# Patient Record
Sex: MALE | Race: WHITE | NOT HISPANIC OR LATINO | Employment: UNEMPLOYED | URBAN - METROPOLITAN AREA
[De-identification: names, ages, dates, MRNs, and addresses within clinical notes are randomized per-mention and may not be internally consistent; named-entity substitution may affect disease eponyms.]

---

## 2021-01-04 ENCOUNTER — APPOINTMENT (OUTPATIENT)
Dept: LAB | Age: 46
End: 2021-01-04

## 2021-01-04 ENCOUNTER — TRANSCRIBE ORDERS (OUTPATIENT)
Dept: ADMINISTRATIVE | Age: 46
End: 2021-01-04

## 2021-01-04 DIAGNOSIS — Z11.59 ENCOUNTER FOR SCREENING FOR OTHER VIRAL DISEASES: Primary | ICD-10-CM

## 2021-01-04 DIAGNOSIS — Z11.3 SCREENING FOR STDS (SEXUALLY TRANSMITTED DISEASES): ICD-10-CM

## 2021-01-04 DIAGNOSIS — Z11.4 SCREENING FOR HUMAN IMMUNODEFICIENCY VIRUS: ICD-10-CM

## 2021-01-04 DIAGNOSIS — Z11.59 ENCOUNTER FOR SCREENING FOR OTHER VIRAL DISEASES: ICD-10-CM

## 2021-01-04 LAB
HBV SURFACE AG SER QL: NORMAL
HCV AB SER QL: NORMAL

## 2021-01-04 PROCEDURE — 87591 N.GONORRHOEAE DNA AMP PROB: CPT

## 2021-01-04 PROCEDURE — 87491 CHLMYD TRACH DNA AMP PROBE: CPT

## 2021-01-04 PROCEDURE — 86803 HEPATITIS C AB TEST: CPT

## 2021-01-04 PROCEDURE — 87389 HIV-1 AG W/HIV-1&-2 AB AG IA: CPT

## 2021-01-04 PROCEDURE — 86592 SYPHILIS TEST NON-TREP QUAL: CPT

## 2021-01-04 PROCEDURE — 36415 COLL VENOUS BLD VENIPUNCTURE: CPT

## 2021-01-04 PROCEDURE — 87340 HEPATITIS B SURFACE AG IA: CPT

## 2021-01-05 LAB
C TRACH DNA SPEC QL NAA+PROBE: NEGATIVE
HIV 1+2 AB+HIV1 P24 AG SERPL QL IA: NORMAL
N GONORRHOEA DNA SPEC QL NAA+PROBE: NEGATIVE
RPR SER QL: NORMAL

## 2021-06-11 ENCOUNTER — OFFICE VISIT (OUTPATIENT)
Dept: INTERNAL MEDICINE CLINIC | Facility: CLINIC | Age: 46
End: 2021-06-11
Payer: COMMERCIAL

## 2021-06-11 VITALS
OXYGEN SATURATION: 99 % | HEART RATE: 87 BPM | TEMPERATURE: 97.5 F | BODY MASS INDEX: 31.35 KG/M2 | SYSTOLIC BLOOD PRESSURE: 126 MMHG | DIASTOLIC BLOOD PRESSURE: 74 MMHG | WEIGHT: 219 LBS | HEIGHT: 70 IN

## 2021-06-11 DIAGNOSIS — E66.09 CLASS 1 OBESITY DUE TO EXCESS CALORIES WITHOUT SERIOUS COMORBIDITY WITH BODY MASS INDEX (BMI) OF 30.0 TO 30.9 IN ADULT: ICD-10-CM

## 2021-06-11 DIAGNOSIS — F11.11 NARCOTIC ABUSE IN REMISSION (HCC): ICD-10-CM

## 2021-06-11 DIAGNOSIS — Z00.00 HEALTHCARE MAINTENANCE: Primary | ICD-10-CM

## 2021-06-11 PROBLEM — E66.811 CLASS 1 OBESITY DUE TO EXCESS CALORIES WITHOUT SERIOUS COMORBIDITY IN ADULT: Status: ACTIVE | Noted: 2021-06-11

## 2021-06-11 PROCEDURE — 99204 OFFICE O/P NEW MOD 45 MIN: CPT | Performed by: INTERNAL MEDICINE

## 2021-06-11 RX ORDER — DIPHENOXYLATE HYDROCHLORIDE AND ATROPINE SULFATE 2.5; .025 MG/1; MG/1
1 TABLET ORAL DAILY
COMMUNITY

## 2021-06-11 RX ORDER — BUPRENORPHINE HYDROCHLORIDE AND NALOXONE HYDROCHLORIDE DIHYDRATE 8; 2 MG/1; MG/1
1 TABLET SUBLINGUAL DAILY
COMMUNITY

## 2021-06-11 NOTE — PATIENT INSTRUCTIONS
Heart Healthy Diet   AMBULATORY CARE:   A heart healthy diet  is an eating plan low in unhealthy fats and sodium (salt)  The plan is high in healthy fats and fiber  A heart healthy diet helps improve your cholesterol levels and lowers your risk for heart disease and stroke  A dietitian will teach you how to read and understand food labels  Heart healthy diet guidelines to follow:   · Choose foods that contain healthy fats  ? Unsaturated fats  include monounsaturated and polyunsaturated fats  Unsaturated fat is found in foods such as soybean, canola, olive, corn, and safflower oils  It is also found in soft tub margarine that is made with liquid vegetable oil  ? Omega-3 fat  is found in certain fish, such as salmon, tuna, and trout, and in walnuts and flaxseed  Eat fish high in omega-3 fats at least 2 times a week  · Get 20 to 30 grams of fiber each day  Fruits, vegetables, whole-grain foods, and legumes (cooked beans) are good sources of fiber  · Limit or do not have unhealthy fats  ? Cholesterol  is found in animal foods, such as eggs and lobster, and in dairy products made from whole milk  Limit cholesterol to less than 200 mg each day  ? Saturated fat  is found in meats, such as sanders and hamburger  It is also found in chicken or turkey skin, whole milk, and butter  Limit saturated fat to less than 7% of your total daily calories  ? Trans fat  is found in packaged foods, such as potato chips and cookies  It is also in hard margarine, some fried foods, and shortening  Do not eat foods that contain trans fats  · Limit sodium as directed  You may be told to limit sodium to 2,000 to 2,300 mg each day  Choose low-sodium or no-salt-added foods  Add little or no salt to food you prepare  Use herbs and spices in place of salt         Include the following in your heart healthy plan:  Ask your dietitian or healthcare provider how many servings to have from each of the following food groups:  · Grains:      ? Whole-wheat breads, cereals, and pastas, and brown rice    ? Low-fat, low-sodium crackers and chips    · Vegetables:      ? Broccoli, green beans, green peas, and spinach    ? Collards, kale, and lima beans    ? Carrots, sweet potatoes, tomatoes, and peppers    ? Canned vegetables with no salt added    · Fruits:      ? Bananas, peaches, pears, and pineapple    ? Grapes, raisins, and dates    ? Oranges, tangerines, grapefruit, orange juice, and grapefruit juice    ? Apricots, mangoes, melons, and papaya    ? Raspberries and strawberries    ? Canned fruit with no added sugar    · Low-fat dairy:      ? Nonfat (skim) milk, 1% milk, and low-fat almond, cashew, or soy milks fortified with calcium    ? Low-fat cheese, regular or frozen yogurt, and cottage cheese    · Meats and proteins:      ? Lean cuts of beef and pork (loin, leg, round), skinless chicken and turkey    ? Legumes, soy products, egg whites, or nuts    Limit or do not include the following in your heart healthy plan:   · Unhealthy fats and oils:      ? Whole or 2% milk, cream cheese, sour cream, or cheese    ? High-fat cuts of beef (T-bone steaks, ribs), chicken or turkey with skin, and organ meats such as liver    ? Butter, stick margarine, shortening, and cooking oils such as coconut or palm oil    · Foods and liquids high in sodium:      ? Packaged foods, such as frozen dinners, cookies, macaroni and cheese, and cereals with more than 300 mg of sodium per serving    ? Vegetables with added sodium, such as instant potatoes, vegetables with added sauces, or regular canned vegetables    ? Cured or smoked meats, such as hot dogs, sanders, and sausage    ? High-sodium ketchup, barbecue sauce, salad dressing, pickles, olives, soy sauce, or miso    · Foods and liquids high in sugar:      ? Candy, cake, cookies, pies, or doughnuts    ? Soft drinks (soda), sports drinks, or sweetened tea    ?  Canned or dry mixes for cakes, soups, sauces, or gravies    Other healthy heart guidelines:   · Do not smoke  Nicotine and other chemicals in cigarettes and cigars can cause lung and heart damage  Ask your healthcare provider for information if you currently smoke and need help to quit  E-cigarettes or smokeless tobacco still contain nicotine  Talk to your healthcare provider before you use these products  · Limit or do not drink alcohol as directed  Alcohol can damage your heart and raise your blood pressure  Your healthcare provider may give you specific daily and weekly limits  The general recommended limit is 1 drink a day for women 21 or older and for men 72 or older  Do not have more than 3 drinks in a day or 7 in a week  The recommended limit is 2 drinks a day for men 24to 59years of age  Do not have more than 4 drinks in a day or 14 in a week  A drink of alcohol is 12 ounces of beer, 5 ounces of wine, or 1½ ounces of liquor  · Exercise regularly  Exercise can help you maintain a healthy weight and improve your blood pressure and cholesterol levels  Regular exercise can also decrease your risk for heart problems  Ask your healthcare provider about the best exercise plan for you  Do not start an exercise program without asking your healthcare provider  Follow up with your doctor or cardiologist as directed:  Write down your questions so you remember to ask them during your visits  © Copyright [address] Information is for End User's use only and may not be sold, redistributed or otherwise used for commercial purposes  All illustrations and images included in CareNotes® are the copyrighted property of A D A M , Inc  or [address] [place], [place] [address]rossi   The above information is an  only  It is not intended as medical advice for individual conditions or treatments  Talk to your doctor, nurse or pharmacist before following any medical regimen to see if it is safe and effective for you      Calorie Counting Diet   WHAT YOU NEED TO KNOW:   What is a calorie counting diet? It is a meal plan based on counting calories each day to reach a healthy body weight  You will need to eat fewer calories if you are trying to lose weight  Weight loss may decrease your risk for certain health problems or improve your health if you have health problems  Some of these health problems include heart disease, high blood pressure, and diabetes  What foods should I avoid? Your dietitian will tell you if you need to avoid certain foods based on your body weight and health condition  You may need to avoid high-fat foods if you are at risk for or have heart disease  You may need to eat fewer foods from the breads and starches food group if you have diabetes  How many calories are in foods? The following is a list of foods and drinks with the approximate number of calories in each  Check the food label to find the exact number of calories  A dietitian can tell you how many calories you should have from each food group each day  · Carbohydrate:      ? ½ of a 3-inch bagel, 1 slice of bread, or ½ of a hamburger bun or hot dog bun (80)    ? 1 (8-inch) flour tortilla or ½ cup of cooked rice (100)    ? 1 (6-inch) corn tortilla (80)    ? 1 (6-inch) pancake or 1 cup of bran flakes cereal (110)    ? ½ cup of cooked cereal (80)    ? ½ cup of cooked pasta (85)    ? 1 ounce of pretzels (100)    ? 3 cups of air-popped popcorn without butter or oil (80)    · Dairy:      ? 1 cup of skim or 1% milk (90)    ? 1 cup of 2% milk (120)    ? 1 cup of whole milk (160)    ? 1 cup of 2% chocolate milk (220)    ? 1 ounce of low-fat cheese with 3 grams of fat per ounce (70)    ? 1 ounce of cheddar cheese (114)    ? ½ cup of 1% fat cottage cheese (80)    ? 1 cup of plain or sugar-free, fat-free yogurt (90)    · Protein foods:      ? 3 ounces of fish (not breaded or fried) (95)    ? 3 ounces of breaded, fried fish (195)    ? ¾ cup of tuna canned in water (105)    ?  3 ounces of chicken breast without skin (105)    ? 1 fried chicken breast with skin (350)    ? ¼ cup of fat free egg substitute (40)    ? 1 large egg (75)    ? 3 ounces of lean beef or pork (165)    ? 3 ounces of fried pork chop or ham (185)    ? ½ cup of cooked dried beans, such as kidney, arita, lentils, or navy (115)    ? 3 ounces of bologna or lunch meat (225)    ? 2 links of breakfast sausage (140)    · Vegetables:      ? ½ cup of sliced mushrooms (10)    ? 1 cup of salad greens, such as lettuce, spinach, or esdras (15)    ? ½ cup of steamed asparagus (20)    ? ½ cup of cooked summer squash, zucchini squash, or green or wax beans (25)    ? 1 cup of broccoli or cauliflower florets, or 1 medium tomato (25)    ? 1 large raw carrot or ½ cup of cooked carrots (40)    ? ? of a medium cucumber or 1 stalk of celery (5)    ? 1 small baked potato (160)    ? 1 cup of breaded, fried vegetables (230)    · Fruit:      ? 1 (6-inch) banana (55)     ? ½ of a 4-inch grapefruit (55)    ? 15 grapes (60)    ? 1 medium orange or apple (70)    ? 1 large peach (65)    ? 1 cup of fresh pineapple chunks (75)    ? 1 cup of melon cubes (50)    ? 1¼ cups of whole strawberries (45)    ? ½ cup of fruit canned in juice (55)    ? ½ cup of fruit canned in heavy syrup (110)    ? ? cup of raisins (130)    ? ½ cup of unsweetened fruit juice (60)    ? ½ cup of grape, cranberry, or prune juice (90)    · Fat:      ? 10 peanuts or 2 teaspoons of peanut butter (55)    ? 2 tablespoons of avocado or 1 tablespoon of regular salad dressing (45)    ? 2 slices of sanders (90)    ? 1 teaspoon of oil, such as safflower, canola, corn, or olive oil (45)    ? 2 teaspoons of low-fat margarine, or 1 tablespoon of low-fat mayonnaise (50)    ? 1 teaspoon of regular margarine (40)    ? 1 tablespoon of regular mayonnaise (135)    ? 1 tablespoon of cream cheese or 2 tablespoons of low-fat cream cheese (45)    ?  2 tablespoons of vegetable shortening (215)    · Dessert and sweets:      ? 8 animal crackers or 5 vanilla wafers (80)    ? 1 frozen fruit juice bar (80)    ? ½ cup of ice milk or low-fat frozen yogurt (90)    ? ½ cup of sherbet or sorbet (125)    ? ½ cup of sugar-free pudding or custard (60)    ? ½ cup of ice cream (140)    ? ½ cup of pudding or custard (175)    ? 1 (2-inch) square chocolate brownie (185)    · Combination foods:      ? Bean burrito made with an 8-inch tortilla, without cheese (275)    ? Chicken breast sandwich with lettuce and tomato (325)    ? 1 cup of chicken noodle soup (60)    ? 1 beef taco (175)    ? Regular hamburger with lettuce and tomato (310)    ? Regular cheeseburger with lettuce and tomato (410)     ? ¼ of a 12-inch cheese pizza (280)    ? Fried fish sandwich with lettuce and tomato (425)    ? Hot dog and bun (275)    ? 1½ cups of macaroni and cheese (310)    ? Taco salad with a fried tortilla shell (870)    · Low-calorie foods:      ? 1 tablespoon of ketchup or 1 tablespoon of fat free sour cream (15)    ? 1 teaspoon of mustard (5)    ? ¼ cup of salsa (20)    ? 1 large dill pickle (15)    ? 1 tablespoon of fat free salad dressing (10)    ? 2 teaspoons of low-sugar, light jam or jelly, or 1 tablespoon of sugar-free syrup (15)    ? 1 sugar-free popsicle (15)    ? 1 cup of club soda, seltzer water, or diet soda (0)    CARE AGREEMENT:   You have the right to help plan your care  Discuss treatment options with your healthcare provider to decide what care you want to receive  You always have the right to refuse treatment  The above information is an  only  It is not intended as medical advice for individual conditions or treatments  Talk to your doctor, nurse or pharmacist before following any medical regimen to see if it is safe and effective for you  © Copyright [address] Information is for End User's use only and may not be sold, redistributed or otherwise used for commercial purposes   All illustrations and images included in [id number] are the copyrighted property of A D A M , Inc  or [address] [place], [place] [address] [facility]

## 2021-06-11 NOTE — PROGRESS NOTES
Assessment/Plan:    Healthcare maintenance   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  Again has no medical complaints or concerns  He underwent physical examination in the office today deferred prostate and rectal examination period as noted patient is obese and we did give the patient a slip for complete labs to be performed in the near future including fecal testing  He is not interested in continued care but he was told if any abnormalities are evident we will be calling him for further evaluation and/or treatment  We did set up a tentative appointment to be seen in the office approximately 1 year was told if in between he has any medical concerns or problems to please call    Narcotic abuse in remission [facility])   Patient admits that in the past he was addicted narcotics  He relates that he did sees people dying from abuse and he had may decisions on his own to stop period did not receive any formal treatment but he is remains on Suboxone which she does obtained illegally  We told the patient this she still show some signs restriction and he should be reaching out for some type chronic follow-up and hopefully weaning off this medication eventually    states he is not interested in any type of treatment  or evaluation    Class 1 obesity due to excess calories without serious comorbidity in adult   With the patient's BMI he is considered obese  He states he not only does not watch his caloric intake but does not have any routine exercise program stating that his work in construction is very physical   We did discuss with the patient looking closely at his dietary intake trying to reduce his calories on a daily basis in order loss  Starting some type of routine exercise program in order to again promote weight loss  Patient is he states not interested in either modality  We will discuss this further with the patient if there is abnormalities with his lab testing  Diagnoses and all orders for this visit:    Healthcare maintenance  -     Comprehensive metabolic panel; Future  -     CBC and differential; Future  -     Lipid panel; Future  -     UA (URINE) with reflex to Scope; Future  -     Hemoglobin A1C; Future  -     Occult Blood, Fecal Immunochemical; Future    Class 1 obesity due to excess calories without serious comorbidity with body mass index (BMI) of 30 0 to 30 9 in adult    Narcotic abuse in remission (HCC)    Other orders  -     buprenorphine-naloxone (SUBOXONE) 8-2 mg per SL tablet; Place 1 tablet under the tongue daily  -     multivitamin (THERAGRAN) TABS; Take 1 tablet by mouth daily          Subjective:      Patient ID: Rigoberto Cunha is a 54 y o  male  77-year-old male history of no major medical problems in the past who is here today to establish care in our practice  Patient apparently does have a history in the past of narcotic abuse and is now self medicating on Suboxone  States that he is working in construction and has done so for the past 30 years    Relates that he has not seen any physician has had no formal workup and evaluation that he knows of in the past   Patient states he is here today in order to keep his health insurance that requires evaluation and lab testing      The following portions of the patient's history were reviewed and updated as appropriate:   He  has no past medical history on file  He   Patient Active Problem List    Diagnosis Date Noted   [address] maintenance 06/11/2021    Class 1 obesity due to excess calories without serious comorbidity in adult 06/11/2021    Narcotic abuse in remission ([facility] [address] ) 06/11/2021     He  has no past surgical history on file  His family history is not on file  He  reports that he has never smoked  He has never used smokeless tobacco  He reports previous alcohol use  He reports current drug use  Drug: Marijuana  Current Outpatient Medications   Medication Sig Dispense Refill    buprenorphine-naloxone (SUBOXONE) 8-2 mg per SL tablet Place 1 tablet under the tongue daily      multivitamin (THERAGRAN) TABS Take 1 tablet by mouth daily       No current facility-administered medications for this visit  Current Outpatient Medications on File Prior to Visit   Medication Sig    buprenorphine-naloxone (SUBOXONE) 8-2 mg per SL tablet Place 1 tablet under the tongue daily    multivitamin (THERAGRAN) TABS Take 1 tablet by mouth daily     No current facility-administered medications on file prior to visit  He is allergic to shellfish-derived products - food allergy       Review of Systems   Constitutional: Negative  HENT: Negative  Eyes: Negative  Respiratory: Negative  Cardiovascular: Negative  Gastrointestinal: Negative  Endocrine: Negative  Genitourinary: Negative  Musculoskeletal: Negative  Skin: Negative  Allergic/Immunologic: Negative  Neurological: Negative  Hematological: Negative  Psychiatric/Behavioral: Negative  Objective:      /74   Pulse 87   Temp 97 5 °F (36 4 °C)   Ht 5' 10" (1 778 m)   Wt 99 3 kg (219 lb)   SpO2 99%   BMI 31 42 kg/m²          Physical Exam  Vitals signs and nursing note reviewed  Constitutional:       General: He is not in acute distress  Appearance: Normal appearance  He is obese   He is not ill-appearing, toxic-appearing or diaphoretic  Comments: Obese 50-year-old male who is awake alert in no acute distress and oriented x3   HENT:      Head: Normocephalic and atraumatic  Right Ear: Tympanic membrane, ear canal and external ear normal  There is no impacted cerumen  Left Ear: Tympanic membrane, ear canal and external ear normal  There is no impacted cerumen  Nose: Nose normal  No congestion or rhinorrhea  Mouth/Throat:      Mouth: Mucous membranes are moist       Pharynx: Oropharynx is clear  No oropharyngeal exudate or posterior oropharyngeal erythema  Eyes:      General: No scleral icterus  Right eye: No discharge  Left eye: No discharge  Extraocular Movements: Extraocular movements intact  Conjunctiva/sclera: Conjunctivae normal       Pupils: Pupils are equal, round, and reactive to light  Neck:      Musculoskeletal: Normal range of motion and neck supple  No neck rigidity or muscular tenderness  Vascular: No carotid bruit  Cardiovascular:      Rate and Rhythm: Normal rate and regular rhythm  Pulses: Normal pulses  Heart sounds: Normal heart sounds  No murmur  No friction rub  No gallop  Pulmonary:      Effort: Pulmonary effort is normal  No respiratory distress  Breath sounds: Normal breath sounds  No stridor  No wheezing, rhonchi or rales  Chest:      Chest wall: No tenderness  Abdominal:      General: Bowel sounds are normal  There is no distension  Palpations: Abdomen is soft  There is no mass  Tenderness: There is no abdominal tenderness  There is no right CVA tenderness, left CVA tenderness, guarding or rebound  Hernia: No hernia is present  Comments: Obese   Genitourinary:     Comments:  defers exam  Musculoskeletal: Normal range of motion  General: No swelling, tenderness, deformity or signs of injury  Right lower leg: No edema  Left lower leg: No edema     Lymphadenopathy:      Cervical: No cervical adenopathy  Skin:     General: Skin is warm  Capillary Refill: Capillary refill takes less than 2 seconds  Coloration: Skin is not jaundiced or pale  Findings: No bruising, erythema, lesion or rash  Neurological:      General: No focal deficit present  Mental Status: He is alert and oriented to person, place, and time  Mental status is at baseline  Cranial Nerves: No cranial nerve deficit  Sensory: No sensory deficit  Motor: No weakness  Coordination: Coordination normal       Gait: Gait normal       Deep Tendon Reflexes: Reflexes normal    Psychiatric:         Mood and Affect: Mood normal          Behavior: Behavior normal          Thought Content: Thought content normal          Judgment: Judgment normal       Comments: Do have questions about the patient's thought judgment, he her specially with history narcotic abuse past, illegally  Obtaining medication he legally  Treat his his addiction         BMI Counseling: Body mass index is 31 42 kg/m²  The BMI is above normal  Nutrition recommendations include reducing portion sizes, decreasing overall calorie intake, 3-5 servings of fruits/vegetables daily, reducing fast food intake, consuming healthier snacks, moderation in carbohydrate intake, increasing intake of lean protein, reducing intake of saturated fat and trans fat and reducing intake of cholesterol  Exercise recommendations include exercising 3-5 times per week

## 2021-06-11 NOTE — ASSESSMENT & PLAN NOTE
Patient admits that in the past he was addicted narcotics  He relates that he did sees people dying from abuse and he had may decisions on his own to stop period did not receive any formal treatment but he is remains on Suboxone which she does obtained illegally  We told the patient this she still show some signs restriction and he should be reaching out for some type chronic follow-up and hopefully weaning off this medication eventually    states he is not interested in any type of treatment  or evaluation
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  Again has no medical complaints or concerns  He underwent physical examination in the office today deferred prostate and rectal examination period as noted patient is obese and we did give the patient a slip for complete labs to be performed in the near future including fecal testing  He is not interested in continued care but he was told if any abnormalities are evident we will be calling him for further evaluation and/or treatment    We did set up a tentative appointment to be seen in the office approximately 1 year was told if in between he has any medical concerns or problems to please call
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 
Statement Selected

## 2021-07-31 ENCOUNTER — APPOINTMENT (OUTPATIENT)
Dept: LAB | Age: 46
End: 2021-07-31

## 2021-07-31 DIAGNOSIS — Z00.8 HEALTH EXAMINATION IN POPULATION SURVEY: ICD-10-CM

## 2021-07-31 LAB
CHOLEST SERPL-MCNC: 161 MG/DL (ref 50–200)
EST. AVERAGE GLUCOSE BLD GHB EST-MCNC: 94 MG/DL
HBA1C MFR BLD: 4.9 %
HDLC SERPL-MCNC: 47 MG/DL
LDLC SERPL CALC-MCNC: 100 MG/DL (ref 0–100)
NONHDLC SERPL-MCNC: 114 MG/DL
TRIGL SERPL-MCNC: 68 MG/DL

## 2021-07-31 PROCEDURE — 36415 COLL VENOUS BLD VENIPUNCTURE: CPT

## 2021-07-31 PROCEDURE — 80061 LIPID PANEL: CPT

## 2021-07-31 PROCEDURE — 83036 HEMOGLOBIN GLYCOSYLATED A1C: CPT

## 2022-03-11 ENCOUNTER — APPOINTMENT (OUTPATIENT)
Dept: LAB | Facility: HOSPITAL | Age: 47
End: 2022-03-11
Attending: OBSTETRICS & GYNECOLOGY
Payer: COMMERCIAL

## 2022-03-11 DIAGNOSIS — Z11.59 SCREENING FOR VIRAL DISEASE: ICD-10-CM

## 2022-03-11 DIAGNOSIS — Z11.3 SCREENING FOR STDS (SEXUALLY TRANSMITTED DISEASES): ICD-10-CM

## 2022-03-11 DIAGNOSIS — Z11.4 SCREENING FOR HIV (HUMAN IMMUNODEFICIENCY VIRUS): ICD-10-CM

## 2022-03-11 LAB
HBV SURFACE AG SER QL: NORMAL
HCV AB SER QL: NORMAL

## 2022-03-11 PROCEDURE — 87340 HEPATITIS B SURFACE AG IA: CPT

## 2022-03-11 PROCEDURE — 87491 CHLMYD TRACH DNA AMP PROBE: CPT

## 2022-03-11 PROCEDURE — 87591 N.GONORRHOEAE DNA AMP PROB: CPT

## 2022-03-11 PROCEDURE — 86803 HEPATITIS C AB TEST: CPT

## 2022-03-11 PROCEDURE — 87389 HIV-1 AG W/HIV-1&-2 AB AG IA: CPT

## 2022-03-11 PROCEDURE — 36415 COLL VENOUS BLD VENIPUNCTURE: CPT

## 2022-03-11 PROCEDURE — 86592 SYPHILIS TEST NON-TREP QUAL: CPT

## 2022-03-13 LAB — HIV 1+2 AB+HIV1 P24 AG SERPL QL IA: NORMAL

## 2022-03-14 LAB — RPR SER QL: NORMAL

## 2022-03-15 LAB
C TRACH DNA SPEC QL NAA+PROBE: NEGATIVE
N GONORRHOEA DNA SPEC QL NAA+PROBE: NEGATIVE

## 2022-04-13 ENCOUNTER — APPOINTMENT (OUTPATIENT)
Dept: LAB | Facility: HOSPITAL | Age: 47
End: 2022-04-13
Attending: OBSTETRICS & GYNECOLOGY
Payer: COMMERCIAL

## 2022-04-13 DIAGNOSIS — N46.11 OLIGOSPERMIA: ICD-10-CM

## 2022-04-13 LAB
ESTRADIOL SERPL-MCNC: 61 PG/ML (ref 11–52.5)
FSH SERPL-ACNC: <0.2 MIU/ML (ref 0.7–10.8)
LH SERPL-ACNC: <0.2 MIU/ML (ref 1.2–10.6)
PROLACTIN SERPL-MCNC: 27.4 NG/ML (ref 2.5–17.4)
TSH SERPL DL<=0.05 MIU/L-ACNC: 1.06 UIU/ML (ref 0.45–4.5)

## 2022-04-13 PROCEDURE — 83002 ASSAY OF GONADOTROPIN (LH): CPT

## 2022-04-13 PROCEDURE — 36415 COLL VENOUS BLD VENIPUNCTURE: CPT

## 2022-04-13 PROCEDURE — 84146 ASSAY OF PROLACTIN: CPT

## 2022-04-13 PROCEDURE — 88230 TISSUE CULTURE LYMPHOCYTE: CPT

## 2022-04-13 PROCEDURE — 88262 CHROMOSOME ANALYSIS 15-20: CPT

## 2022-04-13 PROCEDURE — 84443 ASSAY THYROID STIM HORMONE: CPT

## 2022-04-13 PROCEDURE — 83001 ASSAY OF GONADOTROPIN (FSH): CPT

## 2022-04-13 PROCEDURE — 82670 ASSAY OF TOTAL ESTRADIOL: CPT

## 2022-04-13 PROCEDURE — 81479 UNLISTED MOLECULAR PATHOLOGY: CPT

## 2022-04-16 ENCOUNTER — APPOINTMENT (OUTPATIENT)
Dept: LAB | Age: 47
End: 2022-04-16
Payer: COMMERCIAL

## 2022-04-16 PROCEDURE — 36415 COLL VENOUS BLD VENIPUNCTURE: CPT

## 2022-04-16 PROCEDURE — 84403 ASSAY OF TOTAL TESTOSTERONE: CPT

## 2022-04-16 PROCEDURE — 84402 ASSAY OF FREE TESTOSTERONE: CPT

## 2022-04-18 LAB
TESTOST FREE SERPL-MCNC: 19.6 PG/ML (ref 6.8–21.5)
TESTOST SERPL-MCNC: 864 NG/DL (ref 264–916)

## 2022-04-22 LAB — Y CHROM DEL BLD/T: NORMAL

## 2022-05-04 LAB
CELLS ANALYZED: 20
CELLS COUNTED AMN: 20
CELLS KARYOTYPED.TOTAL BLD/T: 2
CLINICAL CYTOGENETICIST SPEC: NORMAL
ISCN BAND LEVEL QL: 500
KARYOTYP BLD/T: NORMAL
KARYOTYP BLD/T: NORMAL
SPECIMEN SOURCE: NORMAL

## 2022-07-29 ENCOUNTER — OFFICE VISIT (OUTPATIENT)
Dept: URGENT CARE | Age: 47
End: 2022-07-29
Payer: COMMERCIAL

## 2022-07-29 ENCOUNTER — APPOINTMENT (OUTPATIENT)
Dept: RADIOLOGY | Age: 47
End: 2022-07-29
Payer: COMMERCIAL

## 2022-07-29 VITALS
HEART RATE: 81 BPM | DIASTOLIC BLOOD PRESSURE: 96 MMHG | SYSTOLIC BLOOD PRESSURE: 141 MMHG | OXYGEN SATURATION: 99 % | RESPIRATION RATE: 20 BRPM | TEMPERATURE: 98.5 F

## 2022-07-29 DIAGNOSIS — S99.921A RIGHT FOOT INJURY, INITIAL ENCOUNTER: ICD-10-CM

## 2022-07-29 DIAGNOSIS — S99.921A RIGHT FOOT INJURY, INITIAL ENCOUNTER: Primary | ICD-10-CM

## 2022-07-29 PROCEDURE — 73630 X-RAY EXAM OF FOOT: CPT

## 2022-07-29 PROCEDURE — 99213 OFFICE O/P EST LOW 20 MIN: CPT | Performed by: PHYSICIAN ASSISTANT

## 2022-07-29 NOTE — PROGRESS NOTES
[facility]        NAME: Florence Howard is a 51 y o  male  : 1975    MRN: [id number]  DATE: 2022  TIME: 5:13 PM    Assessment and Plan   Right foot injury, initial encounter [B89 [id number]]  1  Right foot injury, initial encounter  XR foot 3+ vw right    Ambulatory Referral to Orthopedic Surgery    Orthowedge Shoe     Xray- negative for obvious acute osseous abnormality, pending final read  Hard shoe- placed by medical staff for comfort, NV intact post-splinting    Patient Instructions     RICE- rest, ice, compression, elevation  Hard shoe and nonweightbearing for comfort  Call tomorrow for official xray results  Call Ortho today and follow-up with them in the next 1-2 days for further evaluation and treatment  Call [facility] to schedule an appointment: [phone]  Or the direct Ortho number at [phone] to schedule an appointment   Go to the ER immediately if any numbness, tingling, weakness, change in intensity or location of pain, calf pain or swelling, other new or concerning symptoms     Chief Complaint     Chief Complaint   Patient presents with    Foot Pain     Right foot pain          History of Present Illness       25-year-old male presents for evaluation of right foot pain after injury that occurred about 1-2 months ago  States he stepped wrong/hit the bottom of his right foot  States he did try some ice and elevation with some improvement  Did not try any medication for the pain  States he did notice some swelling to that area which has now resolved  States he has been walking on it  States pain is worse the morning or after walking on all day  He denies any ankle, shin or calf pain or swelling  Denies any radiation of pain  Denies any numbness, tingling, weakness, redness, warmth or other complaints  States he did have a history of plantar fasciitis  Review of Systems   Review of Systems   Constitutional: Negative for fatigue and fever  Respiratory: Negative for shortness of breath  Cardiovascular: Negative for chest pain and leg swelling  Gastrointestinal: Negative for abdominal pain, nausea and vomiting  Musculoskeletal: Positive for arthralgias and joint swelling  Negative for neck pain and neck stiffness  Skin: Negative for rash and wound  Neurological: Negative for weakness, numbness and headaches  All other systems reviewed and are negative  Current Medications       Current Outpatient Medications:     buprenorphine-naloxone (SUBOXONE) 8-2 mg per SL tablet, Place 1 tablet under the tongue daily, Disp: , Rfl:     multivitamin (THERAGRAN) TABS, Take 1 tablet by mouth daily, Disp: , Rfl:     Current Allergies     Allergies as of 07/29/2022 - Reviewed 07/29/2022   Allergen Reaction Noted    Shellfish-derived products - food allergy Hives and Itching 06/11/2021            The following portions of the patient's history were reviewed and updated as appropriate: allergies, current medications, past family history, past medical history, past social history, past surgical history and problem list      History reviewed  No pertinent past medical history  History reviewed  No pertinent surgical history  No family history on file  Medications have been verified  Objective   /96   Pulse 81   Temp 98 5 °F (36 9 °C) (Temporal)   Resp 20   SpO2 99%        Physical Exam     Physical Exam  Vitals and nursing note reviewed  Constitutional:       General: He is not in acute distress  Appearance: He is well-developed  He is not ill-appearing  Cardiovascular:      Rate and Rhythm: Normal rate and regular rhythm  Heart sounds: Normal heart sounds  Pulmonary:      Effort: Pulmonary effort is normal       Breath sounds: Normal breath sounds  No wheezing  Musculoskeletal:      Cervical back: Normal range of motion and neck supple  Right lower leg: Normal  No swelling or tenderness        Right ankle: Normal  No swelling  No tenderness  Normal range of motion  Normal pulse  Right foot: Normal range of motion and normal capillary refill  Tenderness present  No swelling or deformity  Normal pulse  Feet:       Comments: Able to wiggle toes, dorsiflexion/plantar flexion intact  No erythema, warmth, abrasions/lacerations or other abnormalities visualized  No toenail involvement  Skin:     Capillary Refill: Capillary refill takes less than 2 seconds  Neurological:      Mental Status: He is alert and oriented to person, place, and time  Deep Tendon Reflexes: Reflexes are normal and symmetric  Comments: NV intact with sensation and strong peripheral pulses   5/5 strength of LE bilaterally   Psychiatric:         Behavior: Behavior normal

## 2022-07-29 NOTE — PATIENT INSTRUCTIONS
RICE- rest, ice, compression, elevation  Hard shoe and nonweightbearing for comfort  Call tomorrow for official xray results  Call Ortho today and follow-up with them in the next 1-2 days for further evaluation and treatment     Call [facility] to schedule an appointment: [phone]  Or the direct Ortho number at [phone] to schedule an appointment   Go to the ER immediately if any numbness, tingling, weakness, change in intensity or location of pain, calf pain or swelling, other new or concerning symptoms

## 2022-08-07 ENCOUNTER — APPOINTMENT (OUTPATIENT)
Dept: LAB | Age: 47
End: 2022-08-07
Payer: COMMERCIAL

## 2022-08-07 DIAGNOSIS — Z00.8 HEALTH EXAMINATION IN POPULATION SURVEY: ICD-10-CM

## 2022-08-07 LAB
CHOLEST SERPL-MCNC: 194 MG/DL
EST. AVERAGE GLUCOSE BLD GHB EST-MCNC: 111 MG/DL
HBA1C MFR BLD: 5.5 %
HDLC SERPL-MCNC: 51 MG/DL
LDLC SERPL CALC-MCNC: 104 MG/DL (ref 0–100)
NONHDLC SERPL-MCNC: 143 MG/DL
TRIGL SERPL-MCNC: 195 MG/DL

## 2022-08-07 PROCEDURE — 80061 LIPID PANEL: CPT

## 2022-08-07 PROCEDURE — 83036 HEMOGLOBIN GLYCOSYLATED A1C: CPT

## 2022-08-07 PROCEDURE — 36415 COLL VENOUS BLD VENIPUNCTURE: CPT

## 2022-09-09 ENCOUNTER — OFFICE VISIT (OUTPATIENT)
Dept: OBGYN CLINIC | Facility: CLINIC | Age: 47
End: 2022-09-09
Payer: COMMERCIAL

## 2022-09-09 VITALS — DIASTOLIC BLOOD PRESSURE: 90 MMHG | WEIGHT: 219 LBS | BODY MASS INDEX: 31.42 KG/M2 | SYSTOLIC BLOOD PRESSURE: 132 MMHG

## 2022-09-09 DIAGNOSIS — G57.61 MORTON'S NEUROMA OF RIGHT FOOT: Primary | ICD-10-CM

## 2022-09-09 PROCEDURE — 99203 OFFICE O/P NEW LOW 30 MIN: CPT | Performed by: PHYSICIAN ASSISTANT

## 2022-09-09 NOTE — PROGRESS NOTES
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  1  Recommended metatarsal pad for comfort  2  Activities as tolerated with modification to avoid pain  3  Recommend evaluation by one of our podiatrists  Referral placed  4  Follow-up with orthopedics as needed  Chief Complaint     Right foot pain    History of the Present Illness     Monica Tyler is a 51 y o  male who reports to the office today for evaluation of his right foot  He notes chronic pain in the right foot  Pain is been ongoing for the past six months or so  He notes occasional episodes where he has stubbed his foot and toes  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  Pain is localized to the plantar aspect of the foot  Pain is worse with prolonged standing or walking  Pain is relieved with massaging the foot and removing his shoes  He denies any weakness or instability  No numbness or tingling  No fevers or chills  He currently takes nothing for pain  Physical Exam     Wt 99 3 kg (219 lb)   BMI 31 42 kg/m²     Right foot:  No gross deformity  Skin intact  No erythema ecchymosis or swelling  Tenderness to palpation along the plantar aspect of the foot at the base of the second third and fourth metatarsals  Toes are warm and mobile  No tenderness to palpation about the midfoot and hindfoot  Full ankle range of motion without pain  Negative Ervin's click  Sensation intact distally  Brisk capillary refill  Eyes: Anicteric sclerae  ENT: Trachea midline  Lungs: Normal respiratory effort  CV: Capillary refill is less than 2 seconds  Skin: Intact without erythema  Lymph: No palpable lymphadenopathy  Neuro: Sensation is grossly intact to light touch  Psych: Mood and affect are appropriate      Data Review     I have personally reviewed pertinent films in PACS, and my interpretation follows:    X-rays right foot 7/29/22: No acute osseous abnormalities  No fracture or dislocation  No significant degenerative changes  X-rays right foot 9/9/22:  No acute osseous abnormalities  No fracture dislocation  No significant degenerative changes  History reviewed  No pertinent past medical history  History reviewed  No pertinent surgical history  Allergies   Allergen Reactions    Shellfish-Derived Products - Food Allergy Hives and Itching       Current Outpatient Medications on File Prior to Visit   Medication Sig Dispense Refill    buprenorphine-naloxone (SUBOXONE) 8-2 mg per SL tablet Place 1 tablet under the tongue daily (Patient not taking: Reported on 9/9/2022)      multivitamin (THERAGRAN) TABS Take 1 tablet by mouth daily       No current facility-administered medications on file prior to visit  Social History     Tobacco Use    Smoking status: Never Smoker    Smokeless tobacco: Never Used   Substance Use Topics    Alcohol use: Not Currently    Drug use: Yes     Types: Marijuana       History reviewed  No pertinent family history  Review of Systems     As stated in the HPI  All other systems reviewed and are negative        Procedures

## 2022-10-12 PROBLEM — Z00.00 HEALTHCARE MAINTENANCE: Status: RESOLVED | Noted: 2021-06-11 | Resolved: 2022-10-12

## 2022-12-23 ENCOUNTER — HOSPITAL ENCOUNTER (EMERGENCY)
Facility: HOSPITAL | Age: 47
Discharge: HOME | End: 2022-12-23
Attending: EMERGENCY MEDICINE | Admitting: EMERGENCY MEDICINE

## 2022-12-23 ENCOUNTER — APPOINTMENT (EMERGENCY)
Dept: RADIOLOGY | Facility: HOSPITAL | Age: 47
End: 2022-12-23

## 2022-12-23 VITALS
WEIGHT: 212 LBS | RESPIRATION RATE: 20 BRPM | HEIGHT: 70 IN | TEMPERATURE: 96.8 F | BODY MASS INDEX: 30.35 KG/M2 | OXYGEN SATURATION: 97 % | DIASTOLIC BLOOD PRESSURE: 111 MMHG | HEART RATE: 107 BPM | SYSTOLIC BLOOD PRESSURE: 189 MMHG

## 2022-12-23 DIAGNOSIS — T40.2X1A OPIOID OVERDOSE (HCC): Primary | ICD-10-CM

## 2022-12-23 RX ORDER — NALOXONE HYDROCHLORIDE 1 MG/ML
1 INJECTION PARENTERAL ONCE
Status: COMPLETED | OUTPATIENT
Start: 2022-12-23 | End: 2022-12-23

## 2022-12-24 NOTE — ED PROVIDER NOTES
History  Chief Complaint   Patient presents with   • Medical Problem     Patient brought in EMS, patient reports being asleep in his machine when ems came and woke him up with narcan  Denies taking anything other than suboxone which is not prescribed to him  HPI  This is a 41-year-old male with a past medical history significant for opioid use disorder presents emergency department via EMS with concerns for possible overdose  Per EMS the patient was reported to be asleep or unconscious in his vehicle which was not running  EMS arrived to find patient in his car apparently asleep difficult to arouse with sonorous respirations  Patient was administered 2 mg of intranasal Narcan empirically and after several minutes did have improvement in his mental status  He was agreeable to transport to the emergency department  He reports she has been using street Suboxone intermittently he has not prescribed this  Patient himself denies any acute symptoms  He endorses the possibility that he may have overdosed on illicit Suboxone  He does not wish to stay for further work-up or monitoring  He reports to me he understands the risks and is made well aware of the fact that the Narcan can wear off faster than the opioids which may be in his system which could cause return of symptoms loss of consciousness apnea and even death  He accepts this risk  He denies any shortness of breath  He reports he can get a ride from his " shwetha"  Patient denies any prior history of syncope or similar symptoms in the past   He reports that the vehicle was not running  Prior to Admission Medications   Prescriptions Last Dose Informant Patient Reported? Taking?    buprenorphine-naloxone (SUBOXONE) 8-2 mg per SL tablet   Yes No   Sig: Place 1 tablet under the tongue daily   Patient not taking: Reported on 9/9/2022   multivitamin (THERAGRAN) TABS   Yes No   Sig: Take 1 tablet by mouth daily      Facility-Administered Medications: None       History reviewed  No pertinent past medical history  History reviewed  No pertinent surgical history  History reviewed  No pertinent family history  I have reviewed and agree with the history as documented  E-Cigarette/Vaping   • E-Cigarette Use Never User      E-Cigarette/Vaping Substances     Social History     Tobacco Use   • Smoking status: Never   • Smokeless tobacco: Never   Vaping Use   • Vaping Use: Never used   Substance Use Topics   • Alcohol use: Not Currently   • Drug use: Yes     Frequency: 2 0 times per week     Types: Marijuana       Review of Systems   Constitutional: Negative for chills and fever  HENT: Negative for ear pain and sore throat  Eyes: Negative for pain and visual disturbance  Respiratory: Negative for cough and shortness of breath  Cardiovascular: Negative for chest pain and palpitations  Gastrointestinal: Negative for abdominal pain and vomiting  Genitourinary: Negative for dysuria and hematuria  Musculoskeletal: Negative for arthralgias and back pain  Skin: Negative for color change and rash  Neurological: Negative for seizures and syncope  All other systems reviewed and are negative  Physical Exam  Physical Exam  Vitals and nursing note reviewed  Exam conducted with a chaperone present  Constitutional:       General: He is not in acute distress  Appearance: Normal appearance  He is normal weight  He is not ill-appearing, toxic-appearing or diaphoretic  Comments: Patient awake alert oriented seated upright in the bed in no distress   HENT:      Head: Normocephalic and atraumatic  Right Ear: External ear normal       Left Ear: External ear normal       Nose: Nose normal  No congestion or rhinorrhea  Mouth/Throat:      Mouth: Mucous membranes are moist       Pharynx: Oropharynx is clear  Eyes:      Extraocular Movements: Extraocular movements intact        Conjunctiva/sclera: Conjunctivae normal       Pupils: Pupils are equal, round, and reactive to light  Comments: Pupils equal 2 mm bilaterally  Cardiovascular:      Rate and Rhythm: Regular rhythm  Tachycardia present  Pulses: Normal pulses  Heart sounds: Normal heart sounds  No murmur heard  Pulmonary:      Effort: Pulmonary effort is normal  No respiratory distress  Breath sounds: Normal breath sounds  No stridor  No wheezing, rhonchi or rales  Comments: 100% on room air  Lungs clear to auscultation  No respiratory distress tachypnea increased effort or cough  Abdominal:      General: There is no distension  Palpations: Abdomen is soft  Musculoskeletal:      Cervical back: Neck supple  Right lower leg: No edema  Left lower leg: No edema  Skin:     General: Skin is warm and dry  Capillary Refill: Capillary refill takes less than 2 seconds  Comments: No cyanosis   Neurological:      General: No focal deficit present  Mental Status: He is alert  Mental status is at baseline           Vital Signs  ED Triage Vitals [12/23/22 1940]   Temperature Pulse Respirations Blood Pressure SpO2   (!) 96 8 °F (36 °C) (!) 107 20 (!) 189/111 97 %      Temp Source Heart Rate Source Patient Position - Orthostatic VS BP Location FiO2 (%)   Temporal Monitor Sitting Left arm --      Pain Score       No Pain           Vitals:    12/23/22 1940   BP: (!) 189/111   Pulse: (!) 107   Patient Position - Orthostatic VS: Sitting         Visual Acuity      ED Medications  Medications   naloxone (FOR EMS ONLY) (NARCAN) 2 MG/2ML injection 2 mg (0 mg Does not apply Given to EMS 12/23/22 1941)       Diagnostic Studies  Results Reviewed     None                 No orders to display              Procedures  Procedures         ED Course                                             MDM  Number of Diagnoses or Management Options  Opioid overdose [facility]): new and requires workup     Amount and/or Complexity of Data Reviewed  Obtain history from someone other than the patient: yes  Review and summarize past medical records: yes    Risk of Complications, Morbidity, and/or Mortality  Presenting problems: moderate  Diagnostic procedures: moderate  Management options: moderate      This is a 79-year-old male presenting via EMS after being found asleep or unconscious in his vehicle with decreased respirations with concerns for opiate overdose  Patient was administered 2 mg of intranasal Narcan with improvement in his symptoms  He presents to the emergency department cooperative but does not wish to proceed with any further work-up at this time  His vital signs are reviewed and demonstrate some mild hypertension and tachycardia but he has no abnormal physical exam findings at this time  He appears completely lucid at the time of my interview is made well aware of the risks of not proceeding with further monitoring including the possibility that the naloxone could wear off and he could have rebound effect of the opioids causing morbidity or mortality  He denies any suicidal or homicidal ideations reports he has low concerns that this will occur and will be with his friend  He appears to have decision-making capacity at this time  Patient is offered them intranasal Narcan for discharge but declines respectfully  He does not wish to proceed with any further help for his substance abuse or addiction at this time  Patient is made aware that his plan differs from my recommendations but appears to have the capacity to make this decision on his own  I invited him to reconsider and return to emergency department any point should his decision change  Patient during the stay in the ER exhibited no change in mental status  His friend did arrive and agrees to watch over him and take him home and will monitor him  He is made aware of the concerns as well    Patient did complete AGAINST MEDICAL ADVICE paperwork and was provided outpatient contact information for warm handoff program      Disposition  Final diagnoses:   Opioid overdose ([facility] [address] )     Time reflects when diagnosis was documented in both MDM as applicable and the Disposition within this note     Time User Action Codes Description Comment    12/23/2022  7:56 PM Janice Puri Add [G01 [id number]] Opioid overdose [facility])       ED Disposition     ED Disposition   AMA    Condition   --    Date/Time   Fri Dec 23, 2022  7:58 PM    Comment   Date: 12/23/2022  Patient: Jose Burgess  Admitted: 12/23/2022  7:38 PM  Attending Provider: Colleen Stephenson or his authorized caregiver has made the decision for the patient to leave the emergency department against t he advice of his attending physician  He or his authorized caregiver has been informed and understands the inherent risks, including death  He or his authorized caregiver has decided to accept the responsibility for this decision  Jose Burgess a nd all necessary parties have been advised that he may return for further evaluation or treatment  His condition at time of discharge was stable  Jose Burgess had current vital signs as follows:  BP (!) 189/111 (BP Location: Left arm)   Pulse ( !) 107   Temp (!) 96 8 °F (36 °C) (Temporal)   Resp 20   Ht 5' 10" (1 778 m)   Wt 96 2 kg (212 lb)            Follow-up Information     Follow up With Specialties Details Why Contact Info Additional [facility], DO Internal Medicine Schedule an appointment as soon as possible for a visit   [address] [place] [address] [place] [facility] Department Emergency Medicine Go to  If symptoms worsen [facility] [phone]  [facility] Emergency Department, [address], [facility]          Patient's Medications   Discharge Prescriptions    No medications on file       No discharge procedures on file      PDMP Review None          ED Provider  Electronically Signed by           Laxmi Joseph DO  12/23/22 2004

## 2023-07-21 ENCOUNTER — APPOINTMENT (OUTPATIENT)
Dept: LAB | Age: 48
End: 2023-07-21

## 2023-07-21 DIAGNOSIS — Z00.8 HEALTH EXAMINATION IN POPULATION SURVEY: ICD-10-CM

## 2023-07-21 LAB
CHOLEST SERPL-MCNC: 196 MG/DL
EST. AVERAGE GLUCOSE BLD GHB EST-MCNC: 111 MG/DL
HBA1C MFR BLD: 5.5 %
HDLC SERPL-MCNC: 51 MG/DL
LDLC SERPL CALC-MCNC: 130 MG/DL (ref 0–100)
NONHDLC SERPL-MCNC: 145 MG/DL
TRIGL SERPL-MCNC: 75 MG/DL

## 2023-07-21 PROCEDURE — 80061 LIPID PANEL: CPT

## 2023-07-21 PROCEDURE — 83036 HEMOGLOBIN GLYCOSYLATED A1C: CPT

## 2023-07-21 PROCEDURE — 36415 COLL VENOUS BLD VENIPUNCTURE: CPT

## 2024-01-18 ENCOUNTER — PATIENT MESSAGE (OUTPATIENT)
Dept: INTERNAL MEDICINE CLINIC | Facility: CLINIC | Age: 49
End: 2024-01-18

## 2024-01-18 ENCOUNTER — DOCUMENTATION (OUTPATIENT)
Dept: INTERNAL MEDICINE CLINIC | Facility: CLINIC | Age: 49
End: 2024-01-18

## 2024-01-26 ENCOUNTER — OFFICE VISIT (OUTPATIENT)
Dept: INTERNAL MEDICINE CLINIC | Facility: CLINIC | Age: 49
End: 2024-01-26
Payer: COMMERCIAL

## 2024-01-26 VITALS
RESPIRATION RATE: 16 BRPM | SYSTOLIC BLOOD PRESSURE: 132 MMHG | HEART RATE: 80 BPM | WEIGHT: 215 LBS | DIASTOLIC BLOOD PRESSURE: 80 MMHG | BODY MASS INDEX: 30.85 KG/M2 | TEMPERATURE: 97.2 F

## 2024-01-26 DIAGNOSIS — G89.29 CHRONIC PAIN OF RIGHT KNEE: Primary | ICD-10-CM

## 2024-01-26 DIAGNOSIS — M25.561 CHRONIC PAIN OF RIGHT KNEE: Primary | ICD-10-CM

## 2024-01-26 PROCEDURE — 99213 OFFICE O/P EST LOW 20 MIN: CPT | Performed by: INTERNAL MEDICINE

## 2024-01-26 NOTE — ASSESSMENT & PLAN NOTE
Chronic pain in the right knee suspect likely is arthritic in nature x-rays of the knee have been requested.  Patient brought a FMLA form in with him today requesting that we complete this so that he can have days out of work when his knee flares up.  Form will be completed and returned to his employer.

## 2024-01-26 NOTE — PROGRESS NOTES
Name: Jareth Puri      : 1975      MRN: [id number]  Encounter Provider: Kalyan Beckett MD  Encounter Date: 2024   Encounter department: [facility] INTERNAL MEDICINE    Assessment & Plan     1. Chronic pain of right knee  Assessment & Plan:  Chronic pain in the right knee suspect likely is arthritic in nature x-rays of the knee have been requested.  Patient brought a FMLA form in with him today requesting that we complete this so that he can have days out of work when his knee flares up.  Form will be completed and returned to his employer.    Orders:  -     XR knee 4+ vw right injury; Future; Expected date: 2024           Subjective      This pleasant 48-year-old gentleman presents today to the office for an acute visit.  He presents with ongoing pain in his right knee.  He is employed in construction work and indicates that frequently he has a flareup of discomfort and instability in his knee.  The duration of his knee symptoms dates back to 1-1/2 to 2 years.  He is unaware of any particular event that precipitated the current symptoms of his knee discomfort.    He describes intermittent periods of swelling with increased warmth emanating from the knee.  He has not used any over-the-counter medications for relief of his discomfort he finds that stretching and resting the knee seems to be effective to help reduce his discomfort.      Review of Systems   Musculoskeletal:  Positive for arthralgias.        Discomfort in the right knee       Current Outpatient Medications on File Prior to Visit   Medication Sig    multivitamin (THERAGRAN) TABS Take 1 tablet by mouth daily    buprenorphine-naloxone (SUBOXONE) 8-2 mg per SL tablet Place 1 tablet under the tongue daily (Patient not taking: Reported on 2022)       Objective     /80   Pulse 80   Temp (!) 97.2 °F (36.2 °C)   Resp 16   Wt 97.5 kg (215 lb)   BMI 30.85 kg/m²     Physical Exam  Constitutional:        General: Jareth is not in acute distress.     Appearance: Normal appearance. Jareth is not ill-appearing.   HENT:      Head: Normocephalic.   Eyes:      Pupils: Pupils are equal, round, and reactive to light.   Cardiovascular:      Rate and Rhythm: Regular rhythm.      Heart sounds: Normal heart sounds.   Pulmonary:      Breath sounds: No wheezing, rhonchi or rales.   Musculoskeletal:         General: Swelling and tenderness present.      Comments: Examination of the right knee today indicates no laxity either medially or laterally in the knee joint.  There is a small amount of swelling in the joint and slight increase in temperature emanating from the joint compared to the left knee.  Size of the right knee is slightly larger than the left knee   Neurological:      Mental Status: Jareth is alert. Mental status is at baseline.   Psychiatric:         Mood and Affect: Mood normal.         Behavior: Behavior normal.         Thought Content: Thought content normal.         Judgment: Judgment normal.       Kalyan Beckett MD

## 2024-02-05 ENCOUNTER — TELEPHONE (OUTPATIENT)
Dept: INTERNAL MEDICINE CLINIC | Facility: CLINIC | Age: 49
End: 2024-02-05

## 2024-02-06 NOTE — TELEPHONE ENCOUNTER
I do not know anything about his [place] paperwork check in Dr. Partida's inbox to see if the papers are there please thank you

## 2024-08-24 ENCOUNTER — APPOINTMENT (OUTPATIENT)
Dept: LAB | Age: 49
End: 2024-08-24

## 2024-08-24 DIAGNOSIS — Z00.8 HEALTH EXAMINATION IN POPULATION SURVEY: ICD-10-CM

## 2024-08-24 LAB
CHOLEST SERPL-MCNC: 192 MG/DL
EST. AVERAGE GLUCOSE BLD GHB EST-MCNC: 140 MG/DL
HBA1C MFR BLD: 6.5 %
HDLC SERPL-MCNC: 67 MG/DL
LDLC SERPL CALC-MCNC: 113 MG/DL (ref 0–100)
NONHDLC SERPL-MCNC: 125 MG/DL
TRIGL SERPL-MCNC: 61 MG/DL

## 2024-08-24 PROCEDURE — 36415 COLL VENOUS BLD VENIPUNCTURE: CPT

## 2024-08-24 PROCEDURE — 83036 HEMOGLOBIN GLYCOSYLATED A1C: CPT

## 2024-08-24 PROCEDURE — 80061 LIPID PANEL: CPT

## 2024-12-19 NOTE — PROGRESS NOTES
[facility]  OFF[facility] VISIT     PATIENT INFORMATION     Jareth Puri   49 y.o. adult   MRN: [id number]    ASSESSMENT/PLAN     Assessment & Plan  Chronic pain of right knee  Patient was seen in office on 01/26/2024 with ongoing pain in right knee.    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    This has been ongoing for the past 2 to 3 years.    Denies any inciting injury or event that could have precipitated his symptoms.    Patient experiences intermittent periods of swelling with increased warmth emanating from the knee.    He does not use any over-the-counter medications for relief in found that stretching and resting the knee seems to effective to reduce his discomfort.  Voltaren gel and other topical creams also providing relief.  Prior examinations of the knee did not indicate laxity medially or laterally in the knee joint.  There was small amount of swelling in the joint and slight increase in temperature emanating from the joint.  These findings are similar today.    X-ray of the knee was ordered however never completed.    Patient is agreeable to seeing orthopedics for further evaluation, x-rays, and possible steroid injections.  After that last appointment, [facility] paperwork was filled out indicating that patient requires to be 4 days of rest from symptom flareups and that is expected to occur 1 time every 2 to 3 months.    He presents to clinic today for reexamination and update of [facility] paperwork.  Paperwork will be renewed at this time.  Patient will follow-up in approximately 3 months with PCP for annual and health maintenance.  Orders:    Ambulatory Referral to Orthopedic Surgery; Future         HEALTH MAINTENANCE     There is no immunization history on file for this patient.        CHIEF COMPLAINT     Chief Complaint   Patient presents with    [facility]      HISTORY OF PRESENT ILLNESS     Mr. Templeton  Khushi is a 49-year-old male with past medical history of chronic pain of right knee.  Patient was seen in office on 01/26/2024 with ongoing pain in right knee.  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  This has been ongoing for the past 2 to 3 years.  Denies any inciting injury or event that could have precipitated his symptoms.  Patient experiences intermittent periods of swelling with increased warmth emanating from the knee.  He does not use any over-the-counter medications for relief in found that stretching and resting the knee seems to effective to reduce his discomfort.  Prior examinations of the knee did not indicate laxity medially or laterally in the knee joint.  There was small amount of swelling in the joint and slight increase in temperature emanating from the joint.  X-ray of the knee was ordered however never completed.  After that last appointment, [facility] paperwork was filled out indicating that patient requires to be 4 days of rest from symptom flareups and that is expected to occur 1 time every 2 to 3 months.  He presents to clinic today for reexamination and update of [facility] paperwork.    REVIEW OF SYSTEMS     Review of Systems   Constitutional:  Negative for chills and fever.   HENT:  Negative for congestion and rhinorrhea.    Respiratory:  Negative for cough, shortness of breath and wheezing.    Cardiovascular:  Negative for chest pain and leg swelling.   Gastrointestinal:  Negative for abdominal pain, constipation, diarrhea, nausea and vomiting.   Genitourinary:  Negative for difficulty urinating and dysuria.   Musculoskeletal:  Positive for arthralgias and joint swelling. Negative for back pain.   Skin:  Negative for rash and wound.   Neurological:  Negative for dizziness, weakness and headaches.   Psychiatric/Behavioral:  Negative for agitation, behavioral problems and confusion.      OBJECTIVE     Vitals:     12/20/24 0833   BP: 134/80   Weight: 97.1 kg (214 lb)     Physical Exam  Constitutional:       Appearance: Normal appearance.   HENT:      Right Ear: External ear normal.      Left Ear: External ear normal.   Cardiovascular:      Rate and Rhythm: Normal rate and regular rhythm.      Pulses: Normal pulses.      Heart sounds: Normal heart sounds. No murmur heard.  Pulmonary:      Effort: Pulmonary effort is normal. No respiratory distress.      Breath sounds: Normal breath sounds. No wheezing, rhonchi or rales.   Abdominal:      General: Abdomen is flat. Bowel sounds are normal. There is no distension.      Palpations: Abdomen is soft.      Tenderness: There is no abdominal tenderness.   Musculoskeletal:         General: Swelling and tenderness present.      Right lower leg: No edema.      Left lower leg: No edema.      Comments: Right knee with mild swelling.  Mild tenderness to palpation.  No joint instability.  Full range of motion.  Patient does experience pain with ambulation.  Patient also experiences pain with external rotation of the knee, valgus force.    Skin:     General: Skin is warm and dry.   Neurological:      Mental Status: Jareth is alert and oriented to person, place, and time.   Psychiatric:         Mood and Affect: Mood normal.         Behavior: Behavior normal.         Thought Content: Thought content normal.       CURRENT MEDICATIONS     Current Outpatient Medications:     buprenorphine-naloxone (SUBOXONE) 8-2 mg per SL tablet, Place 1 tablet under the tongue daily (Patient not taking: Reported on 9/9/2022), Disp: , Rfl:     multivitamin (THERAGRAN) TABS, Take 1 tablet by mouth daily, Disp: , Rfl:     PAST MEDICAL & SURGICAL HISTORY   No past medical history on file.  No past surgical history on file.  SOCIAL & FAMILY HISTORY     Social History     Socioeconomic History    Marital status: /Civil Union     Spouse name: Not on file    Number of children: Not on file    Years of education:  Not on file    Highest education level: Not on file   Occupational History    Not on file   Tobacco Use    Smoking status: Never    Smokeless tobacco: Never   Vaping Use    Vaping status: Never Used   Substance and Sexual Activity    Alcohol use: Not Currently    Drug use: Yes     Frequency: 2.0 times per week     Types: Marijuana    Sexual activity: Yes   Other Topics Concern    Not on file   Social History Narrative    Not on file     Social Drivers of Health     Financial Resource Strain: Not on file   Food Insecurity: Not on file   Transportation Needs: Not on file   Physical Activity: Not on file   Stress: Not on file   Social Connections: Not on file   Intimate Partner Violence: Not on file   Housing Stability: Not on file     Social History     Substance and Sexual Activity   Alcohol Use Not Currently       Social History     Substance and Sexual Activity   Drug Use Yes    Frequency: 2.0 times per week    Types: Marijuana     Social History     Tobacco Use   Smoking Status Never   Smokeless Tobacco Never     No family history on file.  ==  Ty Nieves DO  [facility]  [address] [place] [id number]  Office: [phone]  Fax: [phone]

## 2024-12-20 ENCOUNTER — OFFICE VISIT (OUTPATIENT)
Age: 49
End: 2024-12-20
Payer: COMMERCIAL

## 2024-12-20 VITALS — WEIGHT: 214 LBS | BODY MASS INDEX: 30.71 KG/M2 | SYSTOLIC BLOOD PRESSURE: 134 MMHG | DIASTOLIC BLOOD PRESSURE: 80 MMHG

## 2024-12-20 DIAGNOSIS — G89.29 CHRONIC PAIN OF RIGHT KNEE: Primary | ICD-10-CM

## 2024-12-20 DIAGNOSIS — M25.561 CHRONIC PAIN OF RIGHT KNEE: Primary | ICD-10-CM

## 2024-12-20 PROCEDURE — 99213 OFFICE O/P EST LOW 20 MIN: CPT | Performed by: STUDENT IN AN ORGANIZED HEALTH CARE EDUCATION/TRAINING PROGRAM

## 2024-12-20 NOTE — ASSESSMENT & PLAN NOTE
Patient was seen in office on 01/26/2024 with ongoing pain in right knee.    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    This has been ongoing for the past 2 to 3 years.    Denies any inciting injury or event that could have precipitated his symptoms.    Patient experiences intermittent periods of swelling with increased warmth emanating from the knee.    He does not use any over-the-counter medications for relief in found that stretching and resting the knee seems to effective to reduce his discomfort.  Voltaren gel and other topical creams also providing relief.  Prior examinations of the knee did not indicate laxity medially or laterally in the knee joint.  There was small amount of swelling in the joint and slight increase in temperature emanating from the joint.  These findings are similar today.    X-ray of the knee was ordered however never completed.    Patient is agreeable to seeing orthopedics for further evaluation, x-rays, and possible steroid injections.  After that last appointment, [facility] paperwork was filled out indicating that patient requires to be 4 days of rest from symptom flareups and that is expected to occur 1 time every 2 to 3 months.    He presents to clinic today for reexamination and update of [facility] paperwork.  Paperwork will be renewed at this time.  Patient will follow-up in approximately 3 months with PCP for annual and health maintenance.  Orders:    Ambulatory Referral to Orthopedic Surgery; Future

## 2025-01-08 ENCOUNTER — TELEPHONE (OUTPATIENT)
Age: 50
End: 2025-01-08

## 2025-01-08 NOTE — TELEPHONE ENCOUNTER
Jareth called and is asking for the status of the FMLA form  he took over 12/20/24. He said he did not receive his copy of the form. Please call  Jareth with an update please call [phone]

## 2025-02-14 ENCOUNTER — TELEPHONE (OUTPATIENT)
Age: 50
End: 2025-02-14

## 2025-02-14 NOTE — TELEPHONE ENCOUNTER
Pt wife returned a call from [place] Pt states a letter was sent to pt but came back, Pt wife will  letter from Office. Thank you

## 2025-08-16 ENCOUNTER — APPOINTMENT (OUTPATIENT)
Dept: LAB | Age: 50
End: 2025-08-16

## 2025-08-16 DIAGNOSIS — Z00.8 HEALTH EXAMINATION IN POPULATION SURVEY: ICD-10-CM

## 2025-08-16 LAB
CHOLEST SERPL-MCNC: 193 MG/DL (ref ?–200)
EST. AVERAGE GLUCOSE BLD GHB EST-MCNC: 114 MG/DL
HBA1C MFR BLD: 5.6 %
HDLC SERPL-MCNC: 46 MG/DL
LDLC SERPL CALC-MCNC: 117 MG/DL (ref 0–100)
NONHDLC SERPL-MCNC: 147 MG/DL
TRIGL SERPL-MCNC: 149 MG/DL (ref ?–150)

## 2025-08-16 PROCEDURE — 83036 HEMOGLOBIN GLYCOSYLATED A1C: CPT

## 2025-08-16 PROCEDURE — 36415 COLL VENOUS BLD VENIPUNCTURE: CPT

## 2025-08-16 PROCEDURE — 80061 LIPID PANEL: CPT
